# Patient Record
Sex: MALE | Race: WHITE | NOT HISPANIC OR LATINO | URBAN - METROPOLITAN AREA
[De-identification: names, ages, dates, MRNs, and addresses within clinical notes are randomized per-mention and may not be internally consistent; named-entity substitution may affect disease eponyms.]

---

## 2018-03-06 ENCOUNTER — IMPORTED ENCOUNTER (OUTPATIENT)
Dept: URBAN - METROPOLITAN AREA CLINIC 9 | Facility: CLINIC | Age: 83
End: 2018-03-06

## 2019-01-31 ENCOUNTER — IMPORTED ENCOUNTER (OUTPATIENT)
Dept: URBAN - METROPOLITAN AREA CLINIC 9 | Facility: CLINIC | Age: 84
End: 2019-01-31

## 2020-03-04 ENCOUNTER — IMPORTED ENCOUNTER (OUTPATIENT)
Dept: URBAN - METROPOLITAN AREA CLINIC 9 | Facility: CLINIC | Age: 85
End: 2020-03-04

## 2021-03-16 ENCOUNTER — IMPORTED ENCOUNTER (OUTPATIENT)
Dept: URBAN - METROPOLITAN AREA CLINIC 9 | Facility: CLINIC | Age: 86
End: 2021-03-16

## 2021-10-17 ASSESSMENT — KERATOMETRY
OD_K1POWER_DIOPTERS: 44.25
OS_AXISANGLE_DEGREES: 136
OD_AXISANGLE2_DEGREES: 107
OD_K2POWER_DIOPTERS: 45
OD_K2POWER_DIOPTERS: 44.75
OS_K2POWER_DIOPTERS: 45
OD_AXISANGLE_DEGREES: 5
OD_AXISANGLE2_DEGREES: 101
OS_AXISANGLE2_DEGREES: 46
OS_AXISANGLE_DEGREES: 1
OS_K2POWER_DIOPTERS: 44.5
OS_AXISANGLE2_DEGREES: 70
OD_K2POWER_DIOPTERS: 44.5
OD_AXISANGLE2_DEGREES: 95
OS_AXISANGLE_DEGREES: 160
OD_K1POWER_DIOPTERS: 44
OD_AXISANGLE_DEGREES: 17
OS_K1POWER_DIOPTERS: 44.25
OS_K2POWER_DIOPTERS: 46
OD_K1POWER_DIOPTERS: 44.25
OS_AXISANGLE2_DEGREES: 91
OD_AXISANGLE_DEGREES: 11
OS_K1POWER_DIOPTERS: 44
OS_K1POWER_DIOPTERS: 44

## 2021-10-17 ASSESSMENT — VISUAL ACUITY
OS_CC: 20/25 - SN
OD_CC: 20/25 SN
OS_CC: 20/30 + SN
OS_CC: 20/30 SN
OS_CC: 20/30 + SN
OD_CC: 20/25 - SN
OS_CC: 20/30 SN
OS_CC: 20/40 - SN
OD_CC: 20/20 - SN
OS_CC: 20/25 + SN
OS_CC: 20/30 SN
OS_CC: 20/40 - SN
OD_CC: 20/50 -2 SN
OD_CC: 20/25 SN
OD_CC: 20/25 - SN
OS_CC: 20/25 SN
OD_CC: 20/20 - SN
OS_SC: 20/40 SN

## 2021-10-17 ASSESSMENT — TONOMETRY
OD_IOP_MMHG: 15
OS_IOP_MMHG: 15
OS_IOP_MMHG: 14
OD_IOP_MMHG: 13
OS_IOP_MMHG: 13
OD_IOP_MMHG: 14
OS_IOP_MMHG: 15

## 2021-10-28 NOTE — PATIENT DISCUSSION
Recommend AREDS2 supplements as well as regular use of Amsler grid. Advised to call immediately if any worsening, distortion, or blurring is noted.

## 2022-03-30 ENCOUNTER — ESTABLISHED PATIENT (OUTPATIENT)
Dept: URBAN - METROPOLITAN AREA CLINIC 4 | Facility: CLINIC | Age: 87
End: 2022-03-30

## 2022-03-30 DIAGNOSIS — H01.024: ICD-10-CM

## 2022-03-30 DIAGNOSIS — H11.153: ICD-10-CM

## 2022-03-30 DIAGNOSIS — E11.9: ICD-10-CM

## 2022-03-30 DIAGNOSIS — H04.123: ICD-10-CM

## 2022-03-30 DIAGNOSIS — H01.021: ICD-10-CM

## 2022-03-30 DIAGNOSIS — H35.3131: ICD-10-CM

## 2022-03-30 PROCEDURE — 92014 COMPRE OPH EXAM EST PT 1/>: CPT

## 2022-03-30 PROCEDURE — 92015 DETERMINE REFRACTIVE STATE: CPT

## 2022-03-30 PROCEDURE — 92134 CPTRZ OPH DX IMG PST SGM RTA: CPT

## 2022-03-30 ASSESSMENT — VISUAL ACUITY
OS_GLARE: 20/100-2
OS_CC: 20/30
OD_GLARE: 20/40
OD_CC: 20/25-2
OU_CC: 20/25

## 2022-03-30 ASSESSMENT — KERATOMETRY
OD_AXISANGLE2_DEGREES: 134
OD_K1POWER_DIOPTERS: 44
OS_AXISANGLE_DEGREES: 124
OS_AXISANGLE2_DEGREES: 34
OS_K2POWER_DIOPTERS: 44
OD_AXISANGLE_DEGREES: 44
OS_K1POWER_DIOPTERS: 43.75
OD_K2POWER_DIOPTERS: 44.50

## 2022-03-30 ASSESSMENT — TONOMETRY
OS_IOP_MMHG: 16
OD_IOP_MMHG: 14

## 2022-07-03 RX ORDER — CYCLOBENZAPRINE HCL 10 MG
TABLET ORAL
COMMUNITY

## 2022-07-03 RX ORDER — OXYBUTYNIN CHLORIDE 15 MG/1
TABLET, EXTENDED RELEASE ORAL
COMMUNITY

## 2022-07-03 RX ORDER — TRAMADOL HYDROCHLORIDE 50 MG/1
TABLET ORAL
COMMUNITY
End: 2022-10-03

## 2022-07-03 RX ORDER — MIDODRINE HYDROCHLORIDE 10 MG/1
TABLET ORAL
COMMUNITY
End: 2022-10-03

## 2022-07-03 RX ORDER — FLECAINIDE ACETATE 150 MG/1
TABLET ORAL
COMMUNITY

## 2022-07-03 RX ORDER — MELOXICAM 15 MG/1
TABLET ORAL
COMMUNITY

## 2022-07-03 RX ORDER — METHYLPREDNISOLONE 4 MG/1
TABLET ORAL
COMMUNITY
End: 2022-10-03

## 2022-07-03 RX ORDER — ESCITALOPRAM OXALATE 10 MG/1
TABLET ORAL
COMMUNITY
End: 2022-10-03

## 2022-07-03 RX ORDER — ISOSORBIDE MONONITRATE 30 MG/1
TABLET, EXTENDED RELEASE ORAL
COMMUNITY
End: 2022-10-03

## 2022-07-03 RX ORDER — FLUDROCORTISONE ACETATE 0.1 MG/1
TABLET ORAL
COMMUNITY
End: 2022-10-03

## 2022-07-03 RX ORDER — METOPROLOL SUCCINATE 25 MG/1
TABLET, EXTENDED RELEASE ORAL
COMMUNITY
End: 2022-10-03

## 2022-07-03 RX ORDER — ESCITALOPRAM OXALATE 20 MG/1
TABLET ORAL
COMMUNITY

## 2022-10-03 PROBLEM — I48.0 PAROXYSMAL ATRIAL FIBRILLATION (HCC): Status: ACTIVE | Noted: 2018-08-08

## 2022-10-03 PROBLEM — M19.91 PRIMARY OSTEOARTHRITIS: Status: ACTIVE | Noted: 2022-10-03

## 2022-10-03 PROBLEM — S72.002A CLOSED FRACTURE OF LEFT HIP (HCC): Status: ACTIVE | Noted: 2022-10-03

## 2022-10-03 PROBLEM — G44.001 INTRACTABLE CLUSTER HEADACHE SYNDROME: Status: ACTIVE | Noted: 2022-10-03

## 2022-10-03 PROBLEM — I25.10 CORONARY ARTERIOSCLEROSIS: Status: ACTIVE | Noted: 2018-08-08

## 2022-10-03 PROBLEM — J90 PLEURAL EFFUSION: Status: ACTIVE | Noted: 2022-10-03

## 2022-10-03 PROBLEM — Z95.0 PRESENCE OF CARDIAC PACEMAKER: Status: ACTIVE | Noted: 2018-08-08

## 2022-10-03 PROBLEM — E78.5 HYPERLIPIDEMIA LDL GOAL <100: Status: ACTIVE | Noted: 2022-10-03

## 2022-10-03 PROBLEM — E11.9 TYPE 2 DIABETES MELLITUS WITHOUT COMPLICATION (HCC): Status: ACTIVE | Noted: 2022-10-03

## 2022-10-03 PROBLEM — I25.2 HISTORY OF HEART ATTACK: Status: ACTIVE | Noted: 2022-10-03

## 2022-10-03 PROBLEM — E04.1 THYROID NODULE: Status: ACTIVE | Noted: 2022-10-03

## 2022-10-03 PROBLEM — I62.9 INTRACRANIAL HEMORRHAGE (HCC): Status: ACTIVE | Noted: 2018-08-08

## 2022-10-03 PROBLEM — I95.9 HYPOTENSION: Status: ACTIVE | Noted: 2022-10-03

## 2022-10-03 PROBLEM — R60.0 LOWER EXTREMITY EDEMA: Status: ACTIVE | Noted: 2022-10-03

## 2022-10-03 PROBLEM — R53.1 WEAKNESS: Status: ACTIVE | Noted: 2022-10-03

## 2022-10-19 PROBLEM — E11.22 TYPE 2 DIABETES MELLITUS WITH CHRONIC KIDNEY DISEASE (HCC): Status: ACTIVE | Noted: 2022-10-19

## 2022-10-19 PROBLEM — N18.30 CHRONIC RENAL DISEASE, STAGE III (HCC): Status: ACTIVE | Noted: 2022-10-19

## 2022-10-19 PROBLEM — E11.9 TYPE 2 DIABETES MELLITUS WITHOUT COMPLICATION (HCC): Status: RESOLVED | Noted: 2022-10-03 | Resolved: 2022-10-19

## 2024-07-02 ENCOUNTER — ESTABLISHED PATIENT (OUTPATIENT)
Facility: LOCATION | Age: 89
End: 2024-07-02

## 2024-07-02 DIAGNOSIS — H11.153: ICD-10-CM

## 2024-07-02 DIAGNOSIS — E11.9: ICD-10-CM

## 2024-07-02 DIAGNOSIS — H01.021: ICD-10-CM

## 2024-07-02 DIAGNOSIS — H35.3131: ICD-10-CM

## 2024-07-02 DIAGNOSIS — H01.024: ICD-10-CM

## 2024-07-02 DIAGNOSIS — H04.123: ICD-10-CM

## 2024-07-02 PROCEDURE — 92134 CPTRZ OPH DX IMG PST SGM RTA: CPT

## 2024-07-02 PROCEDURE — 92015 DETERMINE REFRACTIVE STATE: CPT

## 2024-07-02 PROCEDURE — 92014 COMPRE OPH EXAM EST PT 1/>: CPT

## 2024-07-02 ASSESSMENT — KERATOMETRY
OS_K2POWER_DIOPTERS: 44.50
OD_AXISANGLE_DEGREES: 002
OD_K2POWER_DIOPTERS: 37.25
OD_AXISANGLE2_DEGREES: 92
OS_AXISANGLE_DEGREES: 144
OS_AXISANGLE2_DEGREES: 54
OS_K1POWER_DIOPTERS: 44.25
OD_K1POWER_DIOPTERS: 36.25

## 2024-07-02 ASSESSMENT — VISUAL ACUITY
OD_GLARE: 20/60
OD_CC: 20/30
OS_CC: 20/40
OU_CC: 20/30

## 2024-07-02 ASSESSMENT — TONOMETRY
OD_IOP_MMHG: 13
OS_IOP_MMHG: 13